# Patient Record
Sex: FEMALE | Race: WHITE | NOT HISPANIC OR LATINO | Employment: FULL TIME | URBAN - METROPOLITAN AREA
[De-identification: names, ages, dates, MRNs, and addresses within clinical notes are randomized per-mention and may not be internally consistent; named-entity substitution may affect disease eponyms.]

---

## 2017-03-09 ENCOUNTER — APPOINTMENT (OUTPATIENT)
Dept: MAMMOGRAPHY | Facility: HOSPITAL | Age: 47
End: 2017-03-09
Attending: OBSTETRICS & GYNECOLOGY

## 2017-03-20 ENCOUNTER — HOSPITAL ENCOUNTER (OUTPATIENT)
Dept: MAMMOGRAPHY | Facility: HOSPITAL | Age: 47
Discharge: HOME | End: 2017-03-20
Attending: OBSTETRICS & GYNECOLOGY | Admitting: OBSTETRICS & GYNECOLOGY

## 2017-03-20 DIAGNOSIS — R92.8 ABNORMAL MAMMOGRAM: ICD-10-CM

## 2017-03-20 PROCEDURE — 77066 DX MAMMO INCL CAD BI: CPT | Performed by: RADIOLOGY

## 2017-03-20 PROCEDURE — 77062 BREAST TOMOSYNTHESIS BI: CPT | Performed by: RADIOLOGY

## 2017-03-20 PROCEDURE — G0279 TOMOSYNTHESIS, MAMMO: HCPCS

## 2017-03-20 PROCEDURE — G0204 DX MAMMO INCL CAD BI: HCPCS

## 2018-05-11 ENCOUNTER — HOSPITAL ENCOUNTER (OUTPATIENT)
Dept: MAMMOGRAPHY | Facility: HOSPITAL | Age: 48
Discharge: HOME | End: 2018-05-11
Attending: RADIOLOGY | Admitting: OBSTETRICS & GYNECOLOGY

## 2018-05-11 DIAGNOSIS — R92.8 ABNORMAL MAMMOGRAM: ICD-10-CM

## 2018-05-11 PROCEDURE — 77066 DX MAMMO INCL CAD BI: CPT | Performed by: RADIOLOGY

## 2018-05-11 PROCEDURE — 77062 BREAST TOMOSYNTHESIS BI: CPT | Performed by: RADIOLOGY

## 2018-05-11 PROCEDURE — G0279 TOMOSYNTHESIS, MAMMO: HCPCS

## 2018-05-11 PROCEDURE — 77066 DX MAMMO INCL CAD BI: CPT

## 2018-11-28 ENCOUNTER — LAB REQUISITION (OUTPATIENT)
Dept: LAB | Facility: HOSPITAL | Age: 48
End: 2018-11-28

## 2018-11-28 DIAGNOSIS — Z00.00 ROUTINE GENERAL MEDICAL EXAMINATION AT A HEALTH CARE FACILITY: ICD-10-CM

## 2018-11-28 PROCEDURE — 36415 COLL VENOUS BLD VENIPUNCTURE: CPT | Performed by: OBSTETRICS & GYNECOLOGY

## 2019-11-14 ENCOUNTER — TRANSCRIBE ORDERS (OUTPATIENT)
Dept: MAMMOGRAPHY | Facility: HOSPITAL | Age: 49
End: 2019-11-14

## 2019-11-14 DIAGNOSIS — Z12.31 VISIT FOR SCREENING MAMMOGRAM: Primary | ICD-10-CM

## 2019-12-02 ENCOUNTER — HOSPITAL ENCOUNTER (OUTPATIENT)
Dept: MAMMOGRAPHY | Facility: HOSPITAL | Age: 49
Discharge: HOME | End: 2019-12-02
Admitting: OBSTETRICS & GYNECOLOGY

## 2019-12-02 DIAGNOSIS — Z12.31 VISIT FOR SCREENING MAMMOGRAM: ICD-10-CM

## 2019-12-02 PROCEDURE — 77067 SCR MAMMO BI INCL CAD: CPT

## 2019-12-02 PROCEDURE — 77067 SCR MAMMO BI INCL CAD: CPT | Performed by: RADIOLOGY

## 2019-12-02 PROCEDURE — 77063 BREAST TOMOSYNTHESIS BI: CPT

## 2019-12-02 PROCEDURE — 77063 BREAST TOMOSYNTHESIS BI: CPT | Performed by: RADIOLOGY

## 2020-11-17 ENCOUNTER — TRANSCRIBE ORDERS (OUTPATIENT)
Dept: ADMINISTRATIVE | Facility: HOSPITAL | Age: 50
End: 2020-11-17

## 2020-11-17 DIAGNOSIS — Z12.31 VISIT FOR SCREENING MAMMOGRAM: Primary | ICD-10-CM

## 2020-12-03 ENCOUNTER — HOSPITAL ENCOUNTER (OUTPATIENT)
Dept: MAMMOGRAPHY | Facility: HOSPITAL | Age: 50
Discharge: HOME | End: 2020-12-03
Admitting: OBSTETRICS & GYNECOLOGY

## 2020-12-03 DIAGNOSIS — Z12.31 VISIT FOR SCREENING MAMMOGRAM: ICD-10-CM

## 2020-12-03 PROCEDURE — 77067 SCR MAMMO BI INCL CAD: CPT | Performed by: RADIOLOGY

## 2020-12-03 PROCEDURE — 77063 BREAST TOMOSYNTHESIS BI: CPT | Performed by: RADIOLOGY

## 2020-12-03 PROCEDURE — 77067 SCR MAMMO BI INCL CAD: CPT

## 2020-12-03 PROCEDURE — 77063 BREAST TOMOSYNTHESIS BI: CPT

## 2020-12-09 PROBLEM — Z80.3 FAMILY HISTORY OF BREAST CANCER: Status: ACTIVE | Noted: 2020-12-09

## 2020-12-17 ENCOUNTER — OFFICE VISIT (OUTPATIENT)
Dept: OBSTETRICS AND GYNECOLOGY | Facility: CLINIC | Age: 50
End: 2020-12-17

## 2020-12-17 VITALS
SYSTOLIC BLOOD PRESSURE: 118 MMHG | DIASTOLIC BLOOD PRESSURE: 78 MMHG | WEIGHT: 228 LBS | HEIGHT: 69 IN | TEMPERATURE: 97.1 F | BODY MASS INDEX: 33.77 KG/M2

## 2020-12-17 DIAGNOSIS — Z12.11 SCREENING FOR COLON CANCER: ICD-10-CM

## 2020-12-17 DIAGNOSIS — Z80.3 FAMILY HISTORY OF BREAST CANCER: ICD-10-CM

## 2020-12-17 DIAGNOSIS — Z01.419 WOMEN'S ANNUAL ROUTINE GYNECOLOGICAL EXAMINATION: Primary | ICD-10-CM

## 2020-12-17 DIAGNOSIS — Z12.31 ENCOUNTER FOR SCREENING MAMMOGRAM FOR BREAST CANCER: ICD-10-CM

## 2020-12-17 PROCEDURE — 99396 PREV VISIT EST AGE 40-64: CPT | Performed by: OBSTETRICS & GYNECOLOGY

## 2020-12-17 NOTE — PROGRESS NOTES
GYN Annual Exam     CC - Here for annual exam.        [facility]  Tamanna Manzo is a 50 y.o. female, , who presents for annual well woman exam. Patient's last menstrual period was 2020..  Periods are rare, lasting 3-4 days. .  Dysmenorrhea:none.  Patient reports problems with: none. There were no changes to her medical or surgical history since her last visit.. Partner Status: Marital Status: .  New Partners since last visit: no.  Desires STD Screening: no.    Additional OB/GYN History   Current contraception: contraceptive methods: Tubal ligation  Desires to: do not start contraception  Last Pap :   Last Completed Pap Smear       Status Date      PAP SMEAR Done 2019 Negative        History of abnormal Pap smear: no  Family history of uterine, colon, breast, or ovarian cancer: yes - Breast cancer: mother and MGM  Performs monthly Self-Breast Exam: yes  Last mammogram:   Last Completed Mammogram     Patient has no health maintenance due at this time         Exercises Regularly: yes  Feelings of Anxiety or Depression: no  Tobacco Usage?: No   OB History        3    Para   3    Term   3            AB        Living   3       SAB        TAB        Ectopic        Molar        Multiple        Live Births   3                Health Maintenance   Topic Date Due   • COLONOSCOPY  1972   • ANNUAL PHYSICAL  1975   • INFLUENZA VACCINE  2020   • HEPATITIS C SCREENING  2020   • Annual Gynecologic Pelvic and Breast Exam  2021   • PAP SMEAR  2022   • TDAP/TD VACCINES (2 - Td) 2027   • Pneumococcal Vaccine 0-64  Aged Out       The additional following portions of the patient's history were reviewed and updated as appropriate: allergies, current medications, past family history, past medical history, past social history, past surgical history and problem list.    Review of Systems   Constitutional: Negative.  Negative for chills, diaphoresis, fever and  "unexpected weight loss.   HENT: Negative.    Eyes: Negative.    Respiratory: Negative.    Cardiovascular: Negative.    Gastrointestinal: Negative.  Negative for abdominal pain.   Endocrine: Negative.    Genitourinary: Negative.  Negative for breast discharge, breast lump, breast pain, frequency, menstrual problem, pelvic pain and urinary incontinence.   Musculoskeletal: Negative.    Skin: Negative.    Allergic/Immunologic: Negative.    Neurological: Negative.    Hematological: Negative.    Psychiatric/Behavioral: Negative.      All other systems reviewed and are negative.     I have reviewed and agree with the HPI, ROS, and historical information as entered above. Levi Taveras MD    Objective   /78   Temp 97.1 °F (36.2 °C)   Ht 175.3 cm (69\")   Wt 103 kg (228 lb)   LMP 06/17/2020   Breastfeeding No   BMI 33.67 kg/m²     Physical Exam  Vitals signs and nursing note reviewed. Exam conducted with a chaperone present.   Constitutional:       Appearance: She is well-developed.   HENT:      Head: Normocephalic and atraumatic.   Neck:      Musculoskeletal: Normal range of motion. No muscular tenderness.      Thyroid: No thyroid mass or thyromegaly.   Cardiovascular:      Rate and Rhythm: Normal rate and regular rhythm.      Heart sounds: No murmur.   Pulmonary:      Effort: Pulmonary effort is normal. No retractions.      Breath sounds: Normal breath sounds. No wheezing, rhonchi or rales.   Chest:      Chest wall: No mass or tenderness.      Breasts:         Right: Normal. No mass, nipple discharge, skin change or tenderness.         Left: Normal. No mass, nipple discharge, skin change or tenderness.   Abdominal:      General: Bowel sounds are normal.      Palpations: Abdomen is soft. Abdomen is not rigid. There is no mass.      Tenderness: There is no abdominal tenderness. There is no guarding.      Hernia: No hernia is present. There is no hernia in the left inguinal area.   Genitourinary:     Labia:   "       Right: No rash, tenderness or lesion.         Left: No rash, tenderness or lesion.       Vagina: Normal. No vaginal discharge or lesions.      Cervix: No cervical motion tenderness, discharge, lesion or cervical bleeding.      Uterus: Normal. Not enlarged, not fixed and not tender.       Adnexa:         Right: No mass or tenderness.          Left: No mass or tenderness.        Rectum: No external hemorrhoid.      Comments: Uterus mid position; small; non tender  Neurological:      Mental Status: She is alert and oriented to person, place, and time.   Psychiatric:         Behavior: Behavior normal.            Assessment and Plan    Problem List Items Addressed This Visit     Family history of breast cancer    Overview     Mother at age 73 and maternal grandmother in her late 40s.  Connie model shows increased risk of breast cancer 16%.         Screening for colon cancer      Other Visit Diagnoses     Women's annual routine gynecological examination    -  Primary    Relevant Orders    Pap IG, Rfx HPV ASCU    Lipid Panel    CBC (No Diff)    Comprehensive Metabolic Panel    Vitamin D 25 Hydroxy    TSH    Hemoglobin A1c    Ambulatory Referral to Gastroenterology (Completed)    Encounter for screening mammogram for breast cancer              1. GYN annual well woman exam. 49 yo; had mamm done last week; Due for screening colonoscopy; wants to have done in [place].   2. Reviewed monthly self breast exams.  Instructed to call with lumps, pain, or breast discharge.    3. Recommended use of Vitamin D replacement and getting adequate calcium in her diet. (1500mg)  4. Reccommended Flu Vaccine in Fall of each year.  5. RTC in 1 year or PRN with problems.  6. Other: Wants labwork today and needs to schedule screening colonoscopy.     Levi Taveras MD  12/17/2020

## 2020-12-18 LAB
25(OH)D3+25(OH)D2 SERPL-MCNC: 36.3 NG/ML (ref 30–100)
ALBUMIN SERPL-MCNC: 4.6 G/DL (ref 3.8–4.8)
ALBUMIN/GLOB SERPL: 1.5 {RATIO} (ref 1.2–2.2)
ALP SERPL-CCNC: 81 IU/L (ref 39–117)
ALT SERPL-CCNC: 11 IU/L (ref 0–32)
AST SERPL-CCNC: 17 IU/L (ref 0–40)
BILIRUB SERPL-MCNC: 0.4 MG/DL (ref 0–1.2)
BUN SERPL-MCNC: 18 MG/DL (ref 6–24)
BUN/CREAT SERPL: 18 (ref 9–23)
CALCIUM SERPL-MCNC: 9.4 MG/DL (ref 8.7–10.2)
CHLORIDE SERPL-SCNC: 106 MMOL/L (ref 96–106)
CHOLEST SERPL-MCNC: 161 MG/DL (ref 100–199)
CO2 SERPL-SCNC: 22 MMOL/L (ref 20–29)
CREAT SERPL-MCNC: 1 MG/DL (ref 0.57–1)
ERYTHROCYTE [DISTWIDTH] IN BLOOD BY AUTOMATED COUNT: 13.5 % (ref 11.7–15.4)
GLOBULIN SER CALC-MCNC: 3.1 G/DL (ref 1.5–4.5)
GLUCOSE SERPL-MCNC: 85 MG/DL (ref 65–99)
HBA1C MFR BLD: 5.8 % (ref 4.8–5.6)
HCT VFR BLD AUTO: 42.6 % (ref 34–46.6)
HDLC SERPL-MCNC: 74 MG/DL
HGB BLD-MCNC: 14 G/DL (ref 11.1–15.9)
LDLC SERPL CALC-MCNC: 73 MG/DL (ref 0–99)
Lab: NORMAL
MCH RBC QN AUTO: 26 PG (ref 26.6–33)
MCHC RBC AUTO-ENTMCNC: 32.9 G/DL (ref 31.5–35.7)
MCV RBC AUTO: 79 FL (ref 79–97)
PLATELET # BLD AUTO: 281 X10E3/UL (ref 150–450)
POTASSIUM SERPL-SCNC: 4.2 MMOL/L (ref 3.5–5.2)
PROT SERPL-MCNC: 7.7 G/DL (ref 6–8.5)
RBC # BLD AUTO: 5.38 X10E6/UL (ref 3.77–5.28)
SODIUM SERPL-SCNC: 144 MMOL/L (ref 134–144)
TRIGL SERPL-MCNC: 71 MG/DL (ref 0–149)
TSH SERPL DL<=0.005 MIU/L-ACNC: 1.81 UIU/ML (ref 0.45–4.5)
VLDLC SERPL CALC-MCNC: 14 MG/DL (ref 5–40)
WBC # BLD AUTO: 5.2 X10E3/UL (ref 3.4–10.8)

## 2020-12-22 DIAGNOSIS — Z01.419 WOMEN'S ANNUAL ROUTINE GYNECOLOGICAL EXAMINATION: ICD-10-CM

## 2020-12-29 ENCOUNTER — LAB (OUTPATIENT)
Dept: OBSTETRICS AND GYNECOLOGY | Facility: CLINIC | Age: 50
End: 2020-12-29

## 2020-12-29 DIAGNOSIS — Z00.00 ANNUAL PHYSICAL EXAM: Primary | ICD-10-CM

## 2020-12-30 LAB
ALBUMIN SERPL-MCNC: 4.3 G/DL (ref 3.8–4.8)
ALBUMIN/GLOB SERPL: 1.4 {RATIO} (ref 1.2–2.2)
ALP SERPL-CCNC: 79 IU/L (ref 39–117)
ALT SERPL-CCNC: 16 IU/L (ref 0–32)
AST SERPL-CCNC: 19 IU/L (ref 0–40)
BILIRUB SERPL-MCNC: 0.4 MG/DL (ref 0–1.2)
BUN SERPL-MCNC: 15 MG/DL (ref 6–24)
BUN/CREAT SERPL: 14 (ref 9–23)
CALCIUM SERPL-MCNC: 9.7 MG/DL (ref 8.7–10.2)
CHLORIDE SERPL-SCNC: 104 MMOL/L (ref 96–106)
CO2 SERPL-SCNC: 24 MMOL/L (ref 20–29)
CREAT SERPL-MCNC: 1.07 MG/DL (ref 0.57–1)
ERYTHROCYTE [DISTWIDTH] IN BLOOD BY AUTOMATED COUNT: 13.7 % (ref 11.7–15.4)
GLOBULIN SER CALC-MCNC: 3 G/DL (ref 1.5–4.5)
GLUCOSE SERPL-MCNC: 104 MG/DL (ref 65–99)
HCT VFR BLD AUTO: 41.1 % (ref 34–46.6)
HGB BLD-MCNC: 13.6 G/DL (ref 11.1–15.9)
Lab: NORMAL
MCH RBC QN AUTO: 26.6 PG (ref 26.6–33)
MCHC RBC AUTO-ENTMCNC: 33.1 G/DL (ref 31.5–35.7)
MCV RBC AUTO: 80 FL (ref 79–97)
PLATELET # BLD AUTO: 308 X10E3/UL (ref 150–450)
POTASSIUM SERPL-SCNC: 4.9 MMOL/L (ref 3.5–5.2)
PROT SERPL-MCNC: 7.3 G/DL (ref 6–8.5)
RBC # BLD AUTO: 5.11 X10E6/UL (ref 3.77–5.28)
SODIUM SERPL-SCNC: 142 MMOL/L (ref 134–144)
WBC # BLD AUTO: 5.7 X10E3/UL (ref 3.4–10.8)

## 2022-12-06 ENCOUNTER — TRANSCRIBE ORDERS (OUTPATIENT)
Dept: ADMINISTRATIVE | Facility: HOSPITAL | Age: 52
End: 2022-12-06

## 2022-12-06 DIAGNOSIS — Z12.31 VISIT FOR SCREENING MAMMOGRAM: Primary | ICD-10-CM

## 2022-12-07 ENCOUNTER — HOSPITAL ENCOUNTER (OUTPATIENT)
Dept: MAMMOGRAPHY | Facility: HOSPITAL | Age: 52
Discharge: HOME | End: 2022-12-07
Admitting: OBSTETRICS & GYNECOLOGY

## 2022-12-07 DIAGNOSIS — Z12.31 VISIT FOR SCREENING MAMMOGRAM: ICD-10-CM

## 2022-12-07 PROCEDURE — 77063 BREAST TOMOSYNTHESIS BI: CPT

## 2022-12-07 PROCEDURE — 77067 SCR MAMMO BI INCL CAD: CPT

## 2022-12-07 PROCEDURE — 77063 BREAST TOMOSYNTHESIS BI: CPT | Performed by: RADIOLOGY

## 2022-12-07 PROCEDURE — 77067 SCR MAMMO BI INCL CAD: CPT | Performed by: RADIOLOGY

## 2024-07-26 ENCOUNTER — OFFICE VISIT (OUTPATIENT)
Dept: ORTHOPEDIC SURGERY | Facility: CLINIC | Age: 54
End: 2024-07-26
Payer: COMMERCIAL

## 2024-07-26 VITALS
WEIGHT: 220 LBS | SYSTOLIC BLOOD PRESSURE: 118 MMHG | BODY MASS INDEX: 32.58 KG/M2 | DIASTOLIC BLOOD PRESSURE: 80 MMHG | HEIGHT: 69 IN

## 2024-07-26 DIAGNOSIS — Z98.890 S/P ACL SURGERY: ICD-10-CM

## 2024-07-26 DIAGNOSIS — M17.32 POST-TRAUMATIC OSTEOARTHRITIS OF LEFT KNEE: ICD-10-CM

## 2024-07-26 DIAGNOSIS — M25.562 LEFT KNEE PAIN, UNSPECIFIED CHRONICITY: Primary | ICD-10-CM

## 2024-07-26 RX ORDER — BUPIVACAINE HYDROCHLORIDE 2.5 MG/ML
4 INJECTION, SOLUTION EPIDURAL; INFILTRATION; INTRACAUDAL
Status: COMPLETED | OUTPATIENT
Start: 2024-07-26 | End: 2024-07-26

## 2024-07-26 RX ORDER — LIDOCAINE HYDROCHLORIDE 10 MG/ML
4 INJECTION, SOLUTION EPIDURAL; INFILTRATION; INTRACAUDAL; PERINEURAL
Status: COMPLETED | OUTPATIENT
Start: 2024-07-26 | End: 2024-07-26

## 2024-07-26 RX ORDER — TRIAMCINOLONE ACETONIDE 40 MG/ML
40 INJECTION, SUSPENSION INTRA-ARTICULAR; INTRAMUSCULAR
Status: COMPLETED | OUTPATIENT
Start: 2024-07-26 | End: 2024-07-26

## 2024-07-26 RX ADMIN — TRIAMCINOLONE ACETONIDE 40 MG: 40 INJECTION, SUSPENSION INTRA-ARTICULAR; INTRAMUSCULAR at 10:40

## 2024-07-26 RX ADMIN — LIDOCAINE HYDROCHLORIDE 4 ML: 10 INJECTION, SOLUTION EPIDURAL; INFILTRATION; INTRACAUDAL; PERINEURAL at 10:40

## 2024-07-26 RX ADMIN — BUPIVACAINE HYDROCHLORIDE 4 ML: 2.5 INJECTION, SOLUTION EPIDURAL; INFILTRATION; INTRACAUDAL at 10:40

## 2024-07-26 NOTE — PROGRESS NOTES
Procedure   - Large Joint Arthrocentesis: L knee on 7/26/2024 10:40 AM  Indications: pain  Details: 21 G needle, anterolateral approach  Medications: 40 mg triamcinolone acetonide 40 MG/ML; 4 mL bupivacaine (PF) 0.25 %; 4 mL lidocaine PF 1% 1 %  Outcome: tolerated well, no immediate complications  Procedure, treatment alternatives, risks and benefits explained, specific risks discussed. Consent was given by the patient. Immediately prior to procedure a time out was called to verify the correct patient, procedure, equipment, support staff and site/side marked as required. Patient was prepped and draped in the usual sterile fashion.

## 2024-07-26 NOTE — PROGRESS NOTES
[facility] Note        Subjective     Pain and Initial Evaluation of the Left Knee      HPI    Tamanna Manzo is a 53 y.o. female.  Left knee pain worse over the past few years.  I did an ACL reconstruction on her left knee in .  She had a knee scope .  3 years ago she was treated at  with steroid injections and joint gel injections.  She is also had physical therapy and a brace.    Past Medical History:   Diagnosis Date    Family history of breast cancer     mother and maternal grandmother    Screening breast examination     admits      Past Surgical History:   Procedure Laterality Date     SECTION      KNEE SURGERY Left , 2007    x2    LAPAROSCOPIC TUBAL LIGATION        Family History   Problem Relation Age of Onset    Breast cancer Mother 73    Thyroid cancer Mother     Diabetes Father     Heart disease Father     Hypertension Father     No Known Problems Sister     No Known Problems Brother     No Known Problems Daughter     No Known Problems Son     Breast cancer Maternal Grandmother 40    Stroke Maternal Grandmother     Osteoporosis Maternal Grandmother     No Known Problems Paternal Grandmother     No Known Problems Maternal Aunt     No Known Problems Paternal Aunt     Ovarian cancer Neg Hx      Social History     Socioeconomic History    Marital status:    Tobacco Use    Smoking status: Never     Passive exposure: Past    Smokeless tobacco: Never   Vaping Use    Vaping status: Never Used   Substance and Sexual Activity    Alcohol use: Yes    Drug use: Never    Sexual activity: Yes     Partners: Male      No current outpatient medications on file prior to visit.     No current facility-administered medications on file prior to visit.      No Known Allergies       Review of Systems   Constitutional: Negative.    HENT: Negative.     Eyes: Negative.    Respiratory: Negative.     Cardiovascular: Negative.    Gastrointestinal: Negative.    Endocrine:  "Negative.    Genitourinary: Negative.    Musculoskeletal:  Positive for arthralgias.   Skin: Negative.    Allergic/Immunologic: Negative.    Neurological: Negative.    Hematological: Negative.    Psychiatric/Behavioral: Negative.          I reviewed the patient's chief complaint, history of present illness, review of systems, past medical history, surgical history, family history, social history, medications and allergy list.        Objective      Physical Exam  /80   Ht 175.3 cm (69\")   Wt 99.8 kg (220 lb)   LMP 06/17/2020   BMI 32.49 kg/m²     Body mass index is 32.49 kg/m².    General  Mental Status - alert  General Appearance - cooperative, well groomed, not in acute distress  Orientation - Oriented X3  Build & Nutrition - well developed and well nourished  Posture - normal posture  Gait - normal gait       Ortho Exam  Left knee with varus deformity.  Tender medial joint.  Pain with Patric.  Negative Lachman.  Range of motion 0-1 20.    Imaging/Studies Reviewed and Interpreted:  Imaging Results (Last 24 Hours)       Procedure Component Value Units Date/Time    XR Knee 4+ View Left [[id number]] Resulted: 07/26/24 1016     Updated: 07/26/24 1017    Narrative:      Knee X-Ray  Indication: Pain    Upright AP of bilateral knees. Lateral, skiers and Sunrise views of left   knee     Findings:  No fracture  ACL screws in left knee.  Bone-on-bone medial compartment.    No prior studies were available for comparison.              Assessment    Assessment:  1. Left knee pain, unspecified chronicity    2. Post-traumatic osteoarthritis of left knee    3. S/P ACL surgery        Plan:  Continue over-the-counter medication as needed for discomfort  We discussed all treatment options.  We discussed knee replacement but she is only 53 years of age.  We discussed joint gel and the plan is to do a cortisone injection today.  I injected the left knee with cortisone.  I discussed with the patient the potential benefits of " performing a therapeutic injection of the cortisone as well as potential risks including but not limited to infection, swelling, pain, bleeding, bruising, nerve/vessel damage, skin color changes, transient elevation in blood glucose levels, and fat atrophy. After informed consent and verifying correct patient, procedure site, and type of procedure, the area was prepped with Hibiclens, ethyl chloride was used to numb the skin. The patient tolerated the procedure well. There were no complications.  When the cortisone wears off we can do a joint gel injection.  Knee arthroplasty is the last resort.        Jonnathan Ulrich MD  07/26/24  10:48 EDT      Dictated Utilizing Dragon Dictation.

## 2024-11-27 ENCOUNTER — TRANSCRIBE ORDERS (OUTPATIENT)
Dept: ADMINISTRATIVE | Facility: HOSPITAL | Age: 54
End: 2024-11-27
Payer: COMMERCIAL

## 2024-11-27 DIAGNOSIS — Z12.31 VISIT FOR SCREENING MAMMOGRAM: Primary | ICD-10-CM

## 2025-01-22 ENCOUNTER — HOSPITAL ENCOUNTER (OUTPATIENT)
Dept: MAMMOGRAPHY | Facility: HOSPITAL | Age: 55
Discharge: HOME | End: 2025-01-22
Admitting: OBSTETRICS & GYNECOLOGY
Payer: COMMERCIAL

## 2025-01-22 DIAGNOSIS — Z12.31 VISIT FOR SCREENING MAMMOGRAM: ICD-10-CM

## 2025-01-22 LAB
NCCN CRITERIA FLAG: NORMAL
TYRER CUZICK SCORE: 11.1

## 2025-01-22 PROCEDURE — 77067 SCR MAMMO BI INCL CAD: CPT

## 2025-01-22 PROCEDURE — 77063 BREAST TOMOSYNTHESIS BI: CPT

## 2025-02-27 ENCOUNTER — OFFICE VISIT (OUTPATIENT)
Dept: OBSTETRICS AND GYNECOLOGY | Facility: CLINIC | Age: 55
End: 2025-02-27
Payer: COMMERCIAL

## 2025-02-27 VITALS
DIASTOLIC BLOOD PRESSURE: 86 MMHG | HEIGHT: 69 IN | BODY MASS INDEX: 33.77 KG/M2 | RESPIRATION RATE: 18 BRPM | SYSTOLIC BLOOD PRESSURE: 110 MMHG | WEIGHT: 228 LBS

## 2025-02-27 DIAGNOSIS — Z12.4 SCREENING FOR CERVICAL CANCER: ICD-10-CM

## 2025-02-27 DIAGNOSIS — Z78.0 POSTMENOPAUSAL: ICD-10-CM

## 2025-02-27 DIAGNOSIS — Z01.419 ENCOUNTER FOR ANNUAL ROUTINE GYNECOLOGICAL EXAMINATION: Primary | ICD-10-CM

## 2025-02-27 DIAGNOSIS — Z12.31 ENCOUNTER FOR SCREENING MAMMOGRAM FOR MALIGNANT NEOPLASM OF BREAST: ICD-10-CM

## 2025-02-27 DIAGNOSIS — Z80.3 FAMILY HISTORY OF BREAST CANCER: ICD-10-CM

## 2025-02-27 DIAGNOSIS — Z12.11 SCREENING FOR COLON CANCER: ICD-10-CM

## 2025-02-27 RX ORDER — LEVOTHYROXINE, LIOTHYRONINE 57; 13.5 UG/1; UG/1
1 TABLET ORAL DAILY
COMMUNITY
Start: 2024-12-30

## 2025-02-27 NOTE — PROGRESS NOTES
Gynecologic Annual Exam Note        GYN Annual Exam     CC - Here for annual exam.        IVAN Nolen Lida Manzo is a 54 y.o. female, , who presents for annual well woman exam as a previous patient not seen in the last three years.  She is postmenopausal.. Denies vaginal bleeding.   There were no changes to her medical or surgical history since her last visit. Marital Status: .  She is sexually active. She has not had new partners.. STD testing recommendations have been explained to the patient and she declines STD testing.    The patient would like to discuss the following complaints today: none    Additional OB/GYN History   On HRT? No    Last Pap : 2020. Results: negative. HPV: not done. Her last HPV testing was N/A..   Last Completed Pap Smear       This patient has no relevant Health Maintenance data.          History of abnormal Pap smear: no  Family history of uterine, colon, breast, or ovarian cancer: yes - mother and MGM had breast cancer  Performs monthly Self-Breast Exam: yes  Last mammogram: 2025. Done at [facility]. There is a copy in the chart.    Last Completed Mammogram            MAMMOGRAM (Every 2 Years) Next due on 2025  Mammo Screening Digital Tomosynthesis Bilateral With CAD    2022  Mammo Screening Digital Tomosynthesis Bilateral With CAD    2020  Mammo Screening Digital Tomosynthesis Bilateral With CAD    2019  Mammo Screening Digital Tomosynthesis Bilateral With CAD    2018  Mammo diagnostic digital tomosynthesis bilateral w CAD    Only the first 5 history entries have been loaded, but more history exists.                  Last colonoscopy: has never had a colonoscopy or cologuard. Patient would like order for cologuard.    Last Completed Colonoscopy       This patient has no relevant Health Maintenance data.            She has never had a bone density scan  Exercises Regularly: yes  Feelings of Anxiety or  "Depression: no      Tobacco Usage?: No       Current Outpatient Medications:     NP Thyroid 90 MG tablet, Take 1 tablet by mouth Daily., Disp: , Rfl:     Patient denies the need for medication refills today.    OB History          3    Para   3    Term   3            AB        Living   3         SAB        IAB        Ectopic        Molar        Multiple        Live Births   3                Past Medical History:   Diagnosis Date    Encounter for screening mammogram for malignant neoplasm of breast 2025    Family history of breast cancer     mother and maternal grandmother    Screening breast examination     admits        Past Surgical History:   Procedure Laterality Date     SECTION      KNEE SURGERY Left , 2007    x2    LAPAROSCOPIC TUBAL LIGATION         Health Maintenance   Topic Date Due    BMI FOLLOWUP  Never done    COLORECTAL CANCER SCREENING  Never done    HEPATITIS C SCREENING  Never done    ANNUAL PHYSICAL  Never done    Annual Gynecologic Pelvic and Breast Exam  2021    Pneumococcal Vaccine 50+ (1 of 1 - PCV) Never done    ZOSTER VACCINE (1 of 2) Never done    PAP SMEAR  2023    INFLUENZA VACCINE  2024    COVID-19 Vaccine (3 - - season) 2024    MAMMOGRAM  2027    TDAP/TD VACCINES (3 - Td or Tdap) 2033       The additional following portions of the patient's history were reviewed and updated as appropriate: allergies, current medications, past family history, past medical history, past social history, past surgical history, and problem list.    Review of Systems    I have reviewed and agree with the HPI, ROS, and historical information as entered above.   Levi Taveras MD      Objective   /86   Resp 18   Ht 175.3 cm (69.02\")   Wt 103 kg (228 lb)   LMP 2020   BMI 33.65 kg/m²     Physical Exam       Assessment and Plan    Problem List Items Addressed This Visit       Encounter for screening mammogram for malignant " neoplasm of breast    Overview     Mother at age 73 and maternal grandmother in her late 40s.  Connie model shows increased risk of breast cancer 16%.      Mammogram 1/22/2025 was negative.         Family history of breast cancer    Overview     Mother at age 73 and maternal grandmother in her late 40s.  Ocnnie model shows increased risk of breast cancer 16%.      Mammogram 1/22/2025 was negative.         Postmenopausal    Overview     Went through menopause at age 47.         Screening for cervical cancer    Overview     Pap smear 12/17/2020 was negative.    Pap smear 2/27/2025 with HPV done         Relevant Orders    LIQUID-BASED PAP SMEAR WITH HPV GENOTYPING REGARDLESS OF INTERPRETATION (ROGE,COR,MAD)    Screening for colon cancer    Overview     No prior colonoscopy.  2/27/2025 requests Cologuard.         Relevant Orders    Cologuard - Stool, Per Rectum     Other Visit Diagnoses       Encounter for annual routine gynecological examination    -  Primary    Relevant Orders    LIQUID-BASED PAP SMEAR WITH HPV GENOTYPING REGARDLESS OF INTERPRETATION (ROGE,COR,MAD)            GYN annual well woman exam.  54 years of age.  Menopausal since age 47.  Never had hot flashes.  Family history of breast cancer in her mother and maternal grandmother.  Discussed genetic screening options.  Declines testing at this time.  Mammogram 1/22/2025 was negative.  No prior screening for colon cancer.  Options reviewed.  Patient desires Cologuard at this time.  Started on thyroid replacement at wellness clinic.  Reviewed monthly self breast exams.  Instructed to call with lumps, pain, or breast discharge.  Yearly mammograms ordered.  Ordered mammogram today.  Recommended use of Vitamin D and getting adequate calcium in her diet. (1500mg)  Reviewed BMI and weight loss as preventative health measures.   Colonoscopy recommended.  If low to average risk, cologuard risks/benefits discussed as an option as well.  Recommended Flu Vaccine in Fall of  each year.  RTC in 1 year or PRN with problems.  Return in about 1 year (around 2/27/2026) for Annual physical.         Levi Taveras MD  02/27/2025

## 2025-02-28 LAB — REF LAB TEST METHOD: NORMAL
